# Patient Record
Sex: FEMALE | Race: BLACK OR AFRICAN AMERICAN | NOT HISPANIC OR LATINO
[De-identification: names, ages, dates, MRNs, and addresses within clinical notes are randomized per-mention and may not be internally consistent; named-entity substitution may affect disease eponyms.]

---

## 2020-07-28 ENCOUNTER — TRANSCRIPTION ENCOUNTER (OUTPATIENT)
Age: 25
End: 2020-07-28

## 2020-08-03 ENCOUNTER — TRANSCRIPTION ENCOUNTER (OUTPATIENT)
Age: 25
End: 2020-08-03

## 2023-08-28 ENCOUNTER — EMERGENCY (EMERGENCY)
Facility: HOSPITAL | Age: 28
LOS: 0 days | Discharge: HOME | End: 2023-08-28
Attending: STUDENT IN AN ORGANIZED HEALTH CARE EDUCATION/TRAINING PROGRAM
Payer: COMMERCIAL

## 2023-08-28 VITALS
SYSTOLIC BLOOD PRESSURE: 113 MMHG | RESPIRATION RATE: 17 BRPM | OXYGEN SATURATION: 98 % | HEART RATE: 106 BPM | TEMPERATURE: 98 F | HEIGHT: 61 IN | WEIGHT: 128.09 LBS | DIASTOLIC BLOOD PRESSURE: 73 MMHG

## 2023-08-28 VITALS
RESPIRATION RATE: 16 BRPM | HEART RATE: 66 BPM | DIASTOLIC BLOOD PRESSURE: 65 MMHG | TEMPERATURE: 98 F | SYSTOLIC BLOOD PRESSURE: 100 MMHG | OXYGEN SATURATION: 98 %

## 2023-08-28 DIAGNOSIS — S01.81XA LACERATION WITHOUT FOREIGN BODY OF OTHER PART OF HEAD, INITIAL ENCOUNTER: ICD-10-CM
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
## 2023-08-28 PROCEDURE — 12051 INTMD RPR FACE/MM 2.5 CM/<: CPT

## 2023-08-28 PROCEDURE — 99283 EMERGENCY DEPT VISIT LOW MDM: CPT | Mod: 25

## 2023-08-28 RX ORDER — ACETAMINOPHEN 500 MG
975 TABLET ORAL ONCE
Refills: 0 | Status: COMPLETED | OUTPATIENT
Start: 2023-08-28 | End: 2023-08-28

## 2023-08-28 RX ADMIN — Medication 975 MILLIGRAM(S): at 05:08

## 2023-08-28 NOTE — ED PROVIDER NOTE - PHYSICAL EXAMINATION
General appearance: Nontoxic appearing, conversant, afebrile    Eyes: anicteric sclerae, GAURAV, EOMI   HENT: Atraumatic; oropharynx clear, MMM and no ulcerations, no pharyngeal erythema or exudate   Neck: Trachea midline; Full range of motion, supple   Pulm: normal respiratory effort and no intercostal retractions, normal work of breathing   Extremities: No peripheral edema, no gross deformities, FROM x4   Skin: Dry, normal temperature, turgor and texture; no rash, 2cm vertical mid forehead laceration   Psych: Appropriate affect, cooperative

## 2023-08-28 NOTE — ED ADULT NURSE REASSESSMENT NOTE - NS ED NURSE REASSESS COMMENT FT1
Laceration vertical center of forehead, no bleeding noted at this time. Dr Dennis at bedside to suture.

## 2023-08-28 NOTE — ED PROVIDER NOTE - NSFOLLOWUPINSTRUCTIONS_ED_ALL_ED_FT
Rest, drink plenty of fluids  Advance activity as tolerated  Continue all previously prescribed medications as directed  Follow up with your PMD - bring copies of your results  Return to the ER for fevers, discharge, pain, swelling, or other new or concerning symptoms     - Some swelling, redness, and pain are common with all wounds and normally will go away as the wound heals. If swelling, redness, or pain increases or if the wound feels warm to the touch from the wound, contact your PMD or us.  For any other concerns, such as re-opening the wound, fever, or pus from the wound, please contact us immediately  - After the first day, remove old bandages and gently cleanse the wound with soap and water. Pat dry, do not rub the site. Cleansing twice a day prevents buildup of debris and will result in easier suture removal.

## 2023-08-28 NOTE — ED ADULT NURSE NOTE - OBJECTIVE STATEMENT
Patient is AAOx4.  28 year old female presents to ED with complaint of head trauma that occurred tonight. Per patient, was hit in head accidentally by metal door. Laceration to central forehead, wrapped in gauze at this time. Pt states has a throbbing headache associated with photophobia. Denies LOC, dizziness, weakness, blurred vision, diplopia, phonophobia, nausea, vomiting, chest pain, shortness of breath, numbness/tingling. No facial droop, symmetrical smile, no tongue deviation, no drooling, bilateral upper and lower sensation intact, bilateral equal  strength, clear speech, full ROM. No neuro deficits noted at this time. Respirations equal and unlabored, no acute distress noted at this time.

## 2023-08-28 NOTE — ED ADULT TRIAGE NOTE - NS ED NURSE AMBULANCES
Addended by: ROCÍO DODD on: 2020 09:02 AM     Modules accepted: Orders    
[facility]

## 2023-08-28 NOTE — ED ADULT NURSE NOTE - NSFALLUNIVINTERV_ED_ALL_ED
Bed/Stretcher in lowest position, wheels locked, appropriate side rails in place/Call bell, personal items and telephone in reach/Instruct patient to call for assistance before getting out of bed/chair/stretcher/Non-slip footwear applied when patient is off stretcher/[place] to call system/Physically safe environment - no spills, clutter or unnecessary equipment/Purposeful proactive rounding/Room/bathroom lighting operational, light cord in reach

## 2023-08-28 NOTE — ED PROVIDER NOTE - CLINICAL SUMMARY MEDICAL DECISION MAKING FREE TEXT BOX
29yo female with no pmh presenting with laceration.  Was pulling to open a door when someone pushed it and it hit into her forehead.  Sustained laceration to forehead.  No loc, ac use.  Has throbbing to the area.  No numbness, weakness, other injuries.  Bleeding controlled with pressure.  Last tetanus <5 years ago.  2cm vertical lac mid forehead, neuro intact, no other trauma noted.  Will repair and dc.

## 2023-08-28 NOTE — ED ADULT TRIAGE NOTE - CHIEF COMPLAINT QUOTE
hit his head with a metal door , laceration central forehead , no loc , last tetanus shot within last 5 years. hit his head with a metal door , laceration central forehead , no loc , last tetanus shot within last 5 years. Denies neck pain.

## 2023-08-28 NOTE — ED ADULT NURSE NOTE - CHIEF COMPLAINT QUOTE
hit his head with a metal door , laceration central forehead , no loc , last tetanus shot within last 5 years. Denies neck pain.

## 2023-08-28 NOTE — ED ADULT NURSE REASSESSMENT NOTE - NS ED NURSE REASSESS COMMENT FT1
pt report received from MANUEL Samuels at change of shift. MD Dennis currently at bedside suturing laceration on pt forehead. pt stable and demonstrates no s/s of acute distress at this time. safety maintained.

## 2023-08-28 NOTE — ED PROVIDER NOTE - PATIENT PORTAL LINK FT
You can access the FollowMyHealth Patient Portal offered by Health system by registering at the following website: [email or web address]. By joining [employer]’s FollowMyHealth portal, you will also be able to view your health information using other applications (apps) compatible with our system.

## 2023-10-14 ENCOUNTER — NON-APPOINTMENT (OUTPATIENT)
Age: 28
End: 2023-10-14

## 2023-12-27 ENCOUNTER — NON-APPOINTMENT (OUTPATIENT)
Age: 28
End: 2023-12-27

## 2024-04-05 ENCOUNTER — NON-APPOINTMENT (OUTPATIENT)
Age: 29
End: 2024-04-05